# Patient Record
Sex: MALE | Race: WHITE | URBAN - METROPOLITAN AREA
[De-identification: names, ages, dates, MRNs, and addresses within clinical notes are randomized per-mention and may not be internally consistent; named-entity substitution may affect disease eponyms.]

---

## 2019-02-21 ENCOUNTER — OFFICE VISIT (OUTPATIENT)
Dept: URBAN - METROPOLITAN AREA CLINIC 45 | Facility: CLINIC | Age: 69
End: 2019-02-21
Payer: COMMERCIAL

## 2019-02-21 DIAGNOSIS — H52.4 PRESBYOPIA: Primary | ICD-10-CM

## 2019-02-21 DIAGNOSIS — H40.023 OPEN ANGLE WITH BORDERLINE FINDINGS, HIGH RISK, BILATERAL: ICD-10-CM

## 2019-02-21 PROCEDURE — 92015 DETERMINE REFRACTIVE STATE: CPT | Performed by: OPTOMETRIST

## 2019-02-21 PROCEDURE — 92004 COMPRE OPH EXAM NEW PT 1/>: CPT | Performed by: OPTOMETRIST

## 2019-02-21 ASSESSMENT — INTRAOCULAR PRESSURE
OD: 15
OS: 13

## 2019-02-21 ASSESSMENT — VISUAL ACUITY
OS: 20/40
OD: 20/50

## 2019-02-21 ASSESSMENT — KERATOMETRY
OD: 45.25
OS: 42.38

## 2019-04-01 ENCOUNTER — OFFICE VISIT (OUTPATIENT)
Dept: URBAN - METROPOLITAN AREA CLINIC 45 | Facility: CLINIC | Age: 69
End: 2019-04-01
Payer: MEDICARE

## 2019-04-01 PROCEDURE — 92133 CPTRZD OPH DX IMG PST SGM ON: CPT | Performed by: OPTOMETRIST

## 2019-04-01 PROCEDURE — 92083 EXTENDED VISUAL FIELD XM: CPT | Performed by: OPTOMETRIST

## 2019-04-01 PROCEDURE — 99213 OFFICE O/P EST LOW 20 MIN: CPT | Performed by: OPTOMETRIST

## 2019-04-01 RX ORDER — LATANOPROST 50 UG/ML
0.005 % SOLUTION OPHTHALMIC
Qty: 1 | Refills: 6 | Status: INACTIVE
Start: 2019-04-01 | End: 2019-04-24

## 2019-04-01 ASSESSMENT — INTRAOCULAR PRESSURE
OS: 13
OD: 14

## 2019-04-01 NOTE — IMPRESSION/PLAN
Impression: Primary open-angle glaucoma, bilateral, moderate stage: S17.0511. Plan: Vf and oct done today, shows some defects and thinning. Pt to start Latanoprost 1 gtt qhs ou. Educated patient on risks of non-compliance, side effects, benefits and anticipated results, pt understands.

## 2019-04-24 ENCOUNTER — OFFICE VISIT (OUTPATIENT)
Dept: URBAN - METROPOLITAN AREA CLINIC 45 | Facility: CLINIC | Age: 69
End: 2019-04-24
Payer: MEDICARE

## 2019-04-24 PROCEDURE — 99213 OFFICE O/P EST LOW 20 MIN: CPT | Performed by: OPTOMETRIST

## 2019-04-24 RX ORDER — BRIMONIDINE TARTRATE 2 MG/ML
0.2 % SOLUTION/ DROPS OPHTHALMIC
Qty: 1 | Refills: 6 | Status: INACTIVE
Start: 2019-04-24 | End: 2019-05-15

## 2019-04-24 ASSESSMENT — INTRAOCULAR PRESSURE
OS: 13
OD: 13

## 2019-04-24 NOTE — IMPRESSION/PLAN
Impression: Primary open-angle glaucoma, bilateral, moderate stage: Y04.3176. Plan: IOP was not any better with Latanoprost, pt to d.c. Pt to start Brimonidine 1 gtt bid ou. Educated patient on risks of non-compliance, side effects, benefits and anticipated results, pt understands.

## 2019-05-15 ENCOUNTER — OFFICE VISIT (OUTPATIENT)
Dept: URBAN - METROPOLITAN AREA CLINIC 45 | Facility: CLINIC | Age: 69
End: 2019-05-15
Payer: MEDICARE

## 2019-05-15 DIAGNOSIS — H40.1132 PRIMARY OPEN-ANGLE GLAUCOMA, BILATERAL, MODERATE STAGE: Primary | ICD-10-CM

## 2019-05-15 PROCEDURE — 99212 OFFICE O/P EST SF 10 MIN: CPT | Performed by: OPTOMETRIST

## 2019-05-15 RX ORDER — TRAVOPROST 0.04 MG/ML
0.004 % SOLUTION/ DROPS OPHTHALMIC
Qty: 1 | Refills: 6 | Status: INACTIVE
Start: 2019-05-15 | End: 2019-06-13

## 2019-05-15 ASSESSMENT — INTRAOCULAR PRESSURE
OS: 13
OD: 13

## 2019-05-15 NOTE — IMPRESSION/PLAN
Impression: Primary open-angle glaucoma, bilateral, moderate stage: N89.0869.  Plan: no improvement in iop on Brimoninidine, switch to Travatan Z q hs ou

## 2019-06-12 ENCOUNTER — OFFICE VISIT (OUTPATIENT)
Dept: URBAN - METROPOLITAN AREA CLINIC 45 | Facility: CLINIC | Age: 69
End: 2019-06-12
Payer: MEDICARE

## 2019-06-12 PROCEDURE — 99213 OFFICE O/P EST LOW 20 MIN: CPT | Performed by: OPTOMETRIST

## 2019-06-12 ASSESSMENT — INTRAOCULAR PRESSURE
OD: 11
OS: 11

## 2019-06-12 NOTE — IMPRESSION/PLAN
Impression: Primary open-angle glaucoma, bilateral, moderate stage: A99.7102.  Plan: IOP is good, pt to continue with Travatan Z 1 gtt qhs ou

## 2019-10-15 ENCOUNTER — OFFICE VISIT (OUTPATIENT)
Dept: URBAN - METROPOLITAN AREA CLINIC 45 | Facility: CLINIC | Age: 69
End: 2019-10-15
Payer: MEDICARE

## 2019-10-15 DIAGNOSIS — H01.8 OTHER SPECIFIED INFLAMMATION OF EYELID: ICD-10-CM

## 2019-10-15 DIAGNOSIS — H04.123 DRY EYE SYNDROME OF BILATERAL LACRIMAL GLANDS: ICD-10-CM

## 2019-10-15 PROCEDURE — 99213 OFFICE O/P EST LOW 20 MIN: CPT | Performed by: OPTOMETRIST

## 2019-10-15 RX ORDER — TRAVOPROST 0.04 MG/ML
0.004 % SOLUTION/ DROPS OPHTHALMIC
Qty: 7.5 | Refills: 3 | Status: INACTIVE
Start: 2019-10-15 | End: 2019-10-16

## 2019-10-15 ASSESSMENT — INTRAOCULAR PRESSURE
OS: 12
OD: 11

## 2019-10-15 NOTE — IMPRESSION/PLAN
Impression: Primary open-angle glaucoma, bilateral, moderate stage: V85.7551.  Plan: Continue with Travatan Z 1 1gtt qhs ou

## 2020-02-11 ENCOUNTER — OFFICE VISIT (OUTPATIENT)
Dept: URBAN - METROPOLITAN AREA CLINIC 45 | Facility: CLINIC | Age: 70
End: 2020-02-11
Payer: MEDICARE

## 2020-02-11 PROCEDURE — 92014 COMPRE OPH EXAM EST PT 1/>: CPT | Performed by: OPTOMETRIST

## 2020-02-11 RX ORDER — TRAVOPROST 0.04 MG/ML
0.004 % SOLUTION/ DROPS OPHTHALMIC
Qty: 7.5 | Refills: 3 | Status: INACTIVE
Start: 2020-02-11 | End: 2020-06-16

## 2020-02-11 ASSESSMENT — INTRAOCULAR PRESSURE
OS: 13
OD: 12

## 2020-02-11 NOTE — IMPRESSION/PLAN
Impression: Primary open-angle glaucoma, bilateral, moderate stage: J07.7645. Plan: IOP stable, well controlled. Continue Travatan Z QHS OU.

## 2020-06-16 ENCOUNTER — TESTING ONLY (OUTPATIENT)
Dept: URBAN - METROPOLITAN AREA CLINIC 45 | Facility: CLINIC | Age: 70
End: 2020-06-16
Payer: MEDICARE

## 2020-06-16 PROCEDURE — 99213 OFFICE O/P EST LOW 20 MIN: CPT | Performed by: OPTOMETRIST

## 2020-06-16 PROCEDURE — 92083 EXTENDED VISUAL FIELD XM: CPT | Performed by: OPTOMETRIST

## 2020-06-16 PROCEDURE — 92133 CPTRZD OPH DX IMG PST SGM ON: CPT | Performed by: OPTOMETRIST

## 2020-06-16 RX ORDER — TRAVOPROST OPHTHALMIC SOLUTION, 0.004% 0.04 MG/ML
0.004 % SOLUTION/ DROPS OPHTHALMIC
Qty: 7.5 | Refills: 11 | Status: INACTIVE
Start: 2020-06-16 | End: 2020-09-15

## 2020-06-16 ASSESSMENT — INTRAOCULAR PRESSURE
OD: 12
OS: 11

## 2020-06-16 NOTE — IMPRESSION/PLAN
Impression: Primary open-angle glaucoma, bilateral, moderate stage: J55.3142. Plan: vf, oct and iop stable ou. Insurance request to switch to generic. Switch to Travapost 1 gtt QHS OU.

## 2020-09-15 ENCOUNTER — OFFICE VISIT (OUTPATIENT)
Dept: URBAN - METROPOLITAN AREA CLINIC 45 | Facility: CLINIC | Age: 70
End: 2020-09-15
Payer: MEDICARE

## 2020-09-15 DIAGNOSIS — H25.13 AGE-RELATED NUCLEAR CATARACT, BILATERAL: ICD-10-CM

## 2020-09-15 PROCEDURE — 99213 OFFICE O/P EST LOW 20 MIN: CPT | Performed by: OPTOMETRIST

## 2020-09-15 RX ORDER — TRAVOPROST OPHTHALMIC SOLUTION, 0.004% 0.04 MG/ML
0.004 % SOLUTION/ DROPS OPHTHALMIC
Qty: 7.5 | Refills: 11 | Status: INACTIVE
Start: 2020-09-15 | End: 2021-01-15

## 2020-09-15 ASSESSMENT — INTRAOCULAR PRESSURE
OD: 12
OS: 10

## 2020-09-15 NOTE — IMPRESSION/PLAN
Impression: Primary open-angle glaucoma, bilateral, moderate stage: N74.4211. Plan: IOP stable, well controlled OU, Continue with Travaprost QHS OU.

## 2020-09-15 NOTE — IMPRESSION/PLAN
Impression: Age-related nuclear cataract, bilateral: H25.13. Plan: No treatment is required at this time. Will continue to observe condition and or symptoms.

## 2021-01-15 ENCOUNTER — OFFICE VISIT (OUTPATIENT)
Dept: URBAN - METROPOLITAN AREA CLINIC 45 | Facility: CLINIC | Age: 71
End: 2021-01-15
Payer: MEDICARE

## 2021-01-15 PROCEDURE — 99214 OFFICE O/P EST MOD 30 MIN: CPT | Performed by: OPTOMETRIST

## 2021-01-15 RX ORDER — BIMATOPROST 0.1 MG/ML
0.01 % SOLUTION/ DROPS OPHTHALMIC
Qty: 2.5 | Refills: 6 | Status: INACTIVE
Start: 2021-01-15 | End: 2021-03-17

## 2021-01-15 ASSESSMENT — INTRAOCULAR PRESSURE
OD: 14
OS: 15

## 2021-01-15 NOTE — IMPRESSION/PLAN
Impression: Primary open-angle glaucoma, bilateral, moderate stage: O42.5206.  Plan: IOP IS ELEVATED TODAY OU, DC TRAVOPROST AND SWITCH TO LUMIGAN Q HS OU

## 2021-02-10 ENCOUNTER — OFFICE VISIT (OUTPATIENT)
Dept: URBAN - METROPOLITAN AREA CLINIC 45 | Facility: CLINIC | Age: 71
End: 2021-02-10
Payer: MEDICARE

## 2021-02-10 PROCEDURE — 99213 OFFICE O/P EST LOW 20 MIN: CPT | Performed by: OPTOMETRIST

## 2021-02-10 ASSESSMENT — INTRAOCULAR PRESSURE
OD: 14
OS: 15

## 2021-02-10 NOTE — IMPRESSION/PLAN
Impression: Primary open-angle glaucoma, bilateral, moderate stage: X11.6140.  Plan: iop is not much better on Lumigan but pt has not been on it for a full 3 weeks, recheck in 1 mos, dfe next visit

## 2021-03-17 ENCOUNTER — OFFICE VISIT (OUTPATIENT)
Dept: URBAN - METROPOLITAN AREA CLINIC 45 | Facility: CLINIC | Age: 71
End: 2021-03-17
Payer: MEDICARE

## 2021-03-17 PROCEDURE — 99213 OFFICE O/P EST LOW 20 MIN: CPT | Performed by: OPTOMETRIST

## 2021-03-17 RX ORDER — BIMATOPROST 0.1 MG/ML
0.01 % SOLUTION/ DROPS OPHTHALMIC
Qty: 1 | Refills: 6 | Status: INACTIVE
Start: 2021-03-17 | End: 2021-06-10

## 2021-03-17 ASSESSMENT — INTRAOCULAR PRESSURE
OD: 12
OS: 11

## 2021-03-17 NOTE — IMPRESSION/PLAN
Impression: Primary open-angle glaucoma, bilateral, moderate stage: E24.8811.  Plan: iop is better, continue lumigan q hs ou

## 2021-06-10 ENCOUNTER — OFFICE VISIT (OUTPATIENT)
Dept: URBAN - METROPOLITAN AREA CLINIC 45 | Facility: CLINIC | Age: 71
End: 2021-06-10
Payer: MEDICARE

## 2021-06-10 PROCEDURE — 76514 ECHO EXAM OF EYE THICKNESS: CPT | Performed by: OPTOMETRIST

## 2021-06-10 PROCEDURE — 92083 EXTENDED VISUAL FIELD XM: CPT | Performed by: OPTOMETRIST

## 2021-06-10 PROCEDURE — 99213 OFFICE O/P EST LOW 20 MIN: CPT | Performed by: OPTOMETRIST

## 2021-06-10 PROCEDURE — 92133 CPTRZD OPH DX IMG PST SGM ON: CPT | Performed by: OPTOMETRIST

## 2021-06-10 RX ORDER — BIMATOPROST 0.1 MG/ML
0.01 % SOLUTION/ DROPS OPHTHALMIC
Qty: 1 | Refills: 6 | Status: INACTIVE
Start: 2021-06-10 | End: 2021-09-07

## 2021-06-10 ASSESSMENT — INTRAOCULAR PRESSURE
OD: 12
OS: 14

## 2021-06-10 NOTE — IMPRESSION/PLAN
Impression: Primary open-angle glaucoma, bilateral, moderate stage: O49.8730. Plan:  VF WNL OU, OCT Thinning OU, Stable. CCT Thin OU. IOP is controlled OU, Continue with Lumigan QHS OU.

## 2021-10-11 ENCOUNTER — OFFICE VISIT (OUTPATIENT)
Dept: URBAN - METROPOLITAN AREA CLINIC 45 | Facility: CLINIC | Age: 71
End: 2021-10-11
Payer: MEDICARE

## 2021-10-11 PROCEDURE — 99213 OFFICE O/P EST LOW 20 MIN: CPT | Performed by: OPTOMETRIST

## 2021-10-11 RX ORDER — BIMATOPROST 0.1 MG/ML
0.01 % SOLUTION/ DROPS OPHTHALMIC
Qty: 2.5 | Refills: 6 | Status: INACTIVE
Start: 2021-10-11 | End: 2022-03-01

## 2021-10-11 ASSESSMENT — INTRAOCULAR PRESSURE
OD: 13
OS: 14

## 2021-10-11 NOTE — IMPRESSION/PLAN
Impression: Primary open-angle glaucoma, bilateral, moderate stage: A00.2302.  Plan: iop is stable ou, continue lumigan q hs ou

## 2022-03-01 ENCOUNTER — OFFICE VISIT (OUTPATIENT)
Dept: URBAN - METROPOLITAN AREA CLINIC 45 | Facility: CLINIC | Age: 72
End: 2022-03-01
Payer: MEDICARE

## 2022-03-01 DIAGNOSIS — H25.813 COMBINED FORMS OF AGE-RELATED CATARACT, BILATERAL: ICD-10-CM

## 2022-03-01 DIAGNOSIS — E11.9 TYPE 2 DIABETES MELLITUS WITHOUT COMPLICATIONS: ICD-10-CM

## 2022-03-01 DIAGNOSIS — H43.813 VITREOUS DEGENERATION, BILATERAL: ICD-10-CM

## 2022-03-01 PROCEDURE — 92014 COMPRE OPH EXAM EST PT 1/>: CPT | Performed by: OPTOMETRIST

## 2022-03-01 RX ORDER — BIMATOPROST 0.1 MG/ML
0.01 % SOLUTION/ DROPS OPHTHALMIC
Qty: 2.5 | Refills: 6 | Status: ACTIVE
Start: 2022-03-01

## 2022-03-01 ASSESSMENT — INTRAOCULAR PRESSURE
OS: 12
OD: 12

## 2022-03-01 NOTE — IMPRESSION/PLAN
Impression: Combined forms of age-related cataract, bilateral: H25.813. Plan: Discussed diagnosis in detail with patient. Surgery not indicated, will monitor. Call if [address]

## 2022-03-01 NOTE — IMPRESSION/PLAN
Impression: Vitreous degeneration, bilateral: H43.813. Plan: Photos taken today ou- pvd ou. No treatment is required at this time. Will continue to observe condition and or symptoms.

## 2022-03-01 NOTE — IMPRESSION/PLAN
Impression: Type 2 diabetes mellitus without complications: R35.4. Plan: Diabetes type II: no background retinopathy, no signs of neovascularization noted. Discussed ocular and systemic benefits of blood sugar control.

## 2022-03-09 NOTE — IMPRESSION/PLAN
Impression: Presbyopia: H52.4.  Plan: Return for new MRX - Patient has history of hypothyroidism   - Resumed home meds

## 2022-07-01 ENCOUNTER — OFFICE VISIT (OUTPATIENT)
Dept: URBAN - METROPOLITAN AREA CLINIC 45 | Facility: CLINIC | Age: 72
End: 2022-07-01
Payer: MEDICARE

## 2022-07-01 DIAGNOSIS — H40.1132 PRIMARY OPEN-ANGLE GLAUCOMA, BILATERAL, MODERATE STAGE: Primary | ICD-10-CM

## 2022-07-01 DIAGNOSIS — H04.123 DRY EYE SYNDROME OF BILATERAL LACRIMAL GLANDS: ICD-10-CM

## 2022-07-01 DIAGNOSIS — H25.13 AGE-RELATED NUCLEAR CATARACT, BILATERAL: ICD-10-CM

## 2022-07-01 PROCEDURE — 99213 OFFICE O/P EST LOW 20 MIN: CPT | Performed by: OPTOMETRIST

## 2022-07-01 PROCEDURE — 92083 EXTENDED VISUAL FIELD XM: CPT | Performed by: OPTOMETRIST

## 2022-07-01 PROCEDURE — 92133 CPTRZD OPH DX IMG PST SGM ON: CPT | Performed by: OPTOMETRIST

## 2022-07-01 RX ORDER — BIMATOPROST 0.1 MG/ML
0.01 % SOLUTION/ DROPS OPHTHALMIC
Qty: 2.5 | Refills: 6 | Status: ACTIVE
Start: 2022-07-01

## 2022-07-01 ASSESSMENT — INTRAOCULAR PRESSURE
OD: 12
OS: 12

## 2022-07-01 NOTE — IMPRESSION/PLAN
Impression: Primary open-angle glaucoma, bilateral, moderate stage: Z22.0343.  Plan: vf and oct ordered and done today ou

oct - mod rnfl loss ou- stable ou

vf- mod restriction- stable ou

iop is good ou, continue [facility]er ou

## 2022-11-04 ENCOUNTER — OFFICE VISIT (OUTPATIENT)
Dept: URBAN - METROPOLITAN AREA CLINIC 45 | Facility: CLINIC | Age: 72
End: 2022-11-04
Payer: MEDICARE

## 2022-11-04 DIAGNOSIS — H40.1132 PRIMARY OPEN-ANGLE GLAUCOMA, BILATERAL, MODERATE STAGE: ICD-10-CM

## 2022-11-04 DIAGNOSIS — H04.123 DRY EYE SYNDROME OF BILATERAL LACRIMAL GLANDS: Primary | ICD-10-CM

## 2022-11-04 DIAGNOSIS — H25.13 AGE-RELATED NUCLEAR CATARACT, BILATERAL: ICD-10-CM

## 2022-11-04 PROCEDURE — 99213 OFFICE O/P EST LOW 20 MIN: CPT | Performed by: OPTOMETRIST

## 2022-11-04 RX ORDER — BIMATOPROST 0.1 MG/ML
0.01 % SOLUTION/ DROPS OPHTHALMIC
Qty: 7.5 | Refills: 5 | Status: ACTIVE
Start: 2022-11-04

## 2022-11-04 ASSESSMENT — INTRAOCULAR PRESSURE
OS: 12
OD: 12

## 2022-11-04 NOTE — IMPRESSION/PLAN
Impression: Primary open-angle glaucoma, bilateral, moderate stage: F53.0633.  Plan: iop is controlled ou, continue [facility]er ou

## 2023-03-06 ENCOUNTER — OFFICE VISIT (OUTPATIENT)
Dept: URBAN - METROPOLITAN AREA CLINIC 45 | Facility: CLINIC | Age: 73
End: 2023-03-06
Payer: COMMERCIAL

## 2023-03-06 DIAGNOSIS — E11.9 TYPE 2 DIABETES MELLITUS WITHOUT COMPLICATIONS: ICD-10-CM

## 2023-03-06 DIAGNOSIS — H40.1132 PRIMARY OPEN-ANGLE GLAUCOMA, BILATERAL, MODERATE STAGE: ICD-10-CM

## 2023-03-06 DIAGNOSIS — H43.813 VITREOUS DEGENERATION, BILATERAL: ICD-10-CM

## 2023-03-06 DIAGNOSIS — H52.4 PRESBYOPIA: Primary | ICD-10-CM

## 2023-03-06 DIAGNOSIS — H25.813 COMBINED FORMS OF AGE-RELATED CATARACT, BILATERAL: ICD-10-CM

## 2023-03-06 PROCEDURE — 92014 COMPRE OPH EXAM EST PT 1/>: CPT | Performed by: OPTOMETRIST

## 2023-03-06 RX ORDER — BIMATOPROST 0.1 MG/ML
0.01 % SOLUTION/ DROPS OPHTHALMIC
Qty: 7.5 | Refills: 5 | Status: ACTIVE
Start: 2023-03-06

## 2023-03-06 ASSESSMENT — VISUAL ACUITY
OD: 20/40
OS: 20/30

## 2023-03-06 ASSESSMENT — INTRAOCULAR PRESSURE
OS: 15
OD: 15

## 2023-03-06 NOTE — IMPRESSION/PLAN
Impression: Primary open-angle glaucoma, bilateral, moderate stage: J21.3132. Plan: IOP is good ou, continue Lumigan QHS OU.

## 2023-03-06 NOTE — IMPRESSION/PLAN
Impression: Combined forms of age-related cataract, bilateral: H25.813. Plan: Cataracts account for pt complaints. Do not recommend SX at this time. Will monitor for changes. Call if [address] worsens.

## 2023-03-06 NOTE — IMPRESSION/PLAN
Impression: Type 2 diabetes mellitus without complications: Y71.8. Plan: Diabetes type II: no background retinopathy, no signs of neovascularization noted. Discussed ocular and systemic benefits of blood sugar control.

## 2023-07-10 ENCOUNTER — OFFICE VISIT (OUTPATIENT)
Dept: URBAN - METROPOLITAN AREA CLINIC 45 | Facility: CLINIC | Age: 73
End: 2023-07-10
Payer: MEDICARE

## 2023-07-10 DIAGNOSIS — H04.123 DRY EYE SYNDROME OF BILATERAL LACRIMAL GLANDS: ICD-10-CM

## 2023-07-10 DIAGNOSIS — H40.1132 PRIMARY OPEN-ANGLE GLAUCOMA, BILATERAL, MODERATE STAGE: Primary | ICD-10-CM

## 2023-07-10 PROCEDURE — 99213 OFFICE O/P EST LOW 20 MIN: CPT | Performed by: OPTOMETRIST

## 2023-07-10 PROCEDURE — 92133 CPTRZD OPH DX IMG PST SGM ON: CPT | Performed by: OPTOMETRIST

## 2023-07-10 PROCEDURE — 92083 EXTENDED VISUAL FIELD XM: CPT | Performed by: OPTOMETRIST

## 2023-07-10 RX ORDER — BIMATOPROST 0.1 MG/ML
0.01 % SOLUTION/ DROPS OPHTHALMIC
Qty: 7.5 | Refills: 5 | Status: ACTIVE
Start: 2023-07-10

## 2023-07-10 ASSESSMENT — INTRAOCULAR PRESSURE
OS: 12
OD: 12

## 2023-07-10 NOTE — IMPRESSION/PLAN
Impression: Primary open-angle glaucoma, bilateral, moderate stage: K80.4491. Plan: IOP is good ou, vf, oct is stable ou.

continue [facility] ou.

## 2023-11-28 ENCOUNTER — OFFICE VISIT (OUTPATIENT)
Dept: URBAN - METROPOLITAN AREA CLINIC 45 | Facility: CLINIC | Age: 73
End: 2023-11-28
Payer: MEDICARE

## 2023-11-28 DIAGNOSIS — H40.1132 PRIMARY OPEN-ANGLE GLAUCOMA, BILATERAL, MODERATE STAGE: Primary | ICD-10-CM

## 2023-11-28 DIAGNOSIS — H25.13 AGE-RELATED NUCLEAR CATARACT, BILATERAL: ICD-10-CM

## 2023-11-28 PROCEDURE — 99213 OFFICE O/P EST LOW 20 MIN: CPT | Performed by: OPTOMETRIST

## 2023-11-28 RX ORDER — BIMATOPROST 0.1 MG/ML
0.01 % SOLUTION/ DROPS OPHTHALMIC
Qty: 7.5 | Refills: 5 | Status: ACTIVE
Start: 2023-11-28

## 2023-11-28 ASSESSMENT — INTRAOCULAR PRESSURE
OS: 15
OD: 16

## 2024-03-26 ENCOUNTER — OFFICE VISIT (OUTPATIENT)
Dept: URBAN - METROPOLITAN AREA CLINIC 45 | Facility: CLINIC | Age: 74
End: 2024-03-26
Payer: COMMERCIAL

## 2024-03-26 DIAGNOSIS — H43.813 VITREOUS DEGENERATION, BILATERAL: ICD-10-CM

## 2024-03-26 DIAGNOSIS — H25.813 COMBINED FORMS OF AGE-RELATED CATARACT, BILATERAL: ICD-10-CM

## 2024-03-26 DIAGNOSIS — H40.1132 PRIMARY OPEN-ANGLE GLAUCOMA, BILATERAL, MODERATE STAGE: Primary | ICD-10-CM

## 2024-03-26 DIAGNOSIS — H04.123 DRY EYE SYNDROME OF BILATERAL LACRIMAL GLANDS: ICD-10-CM

## 2024-03-26 DIAGNOSIS — E11.9 TYPE 2 DIABETES MELLITUS WITHOUT COMPLICATIONS: ICD-10-CM

## 2024-03-26 PROCEDURE — 92014 COMPRE OPH EXAM EST PT 1/>: CPT | Performed by: OPTOMETRIST

## 2024-03-26 RX ORDER — BIMATOPROST 0.1 MG/ML
0.01 % SOLUTION/ DROPS OPHTHALMIC
Qty: 7.5 | Refills: 5 | Status: ACTIVE
Start: 2024-03-26

## 2024-03-26 ASSESSMENT — INTRAOCULAR PRESSURE
OD: 14
OS: 12

## 2024-07-29 ENCOUNTER — OFFICE VISIT (OUTPATIENT)
Dept: URBAN - METROPOLITAN AREA CLINIC 45 | Facility: CLINIC | Age: 74
End: 2024-07-29
Payer: COMMERCIAL

## 2024-07-29 DIAGNOSIS — H25.13 AGE-RELATED NUCLEAR CATARACT, BILATERAL: ICD-10-CM

## 2024-07-29 DIAGNOSIS — H40.1132 PRIMARY OPEN-ANGLE GLAUCOMA, BILATERAL, MODERATE STAGE: Primary | ICD-10-CM

## 2024-07-29 PROCEDURE — 92133 CPTRZD OPH DX IMG PST SGM ON: CPT | Performed by: OPTOMETRIST

## 2024-07-29 PROCEDURE — 99213 OFFICE O/P EST LOW 20 MIN: CPT | Performed by: OPTOMETRIST

## 2024-07-29 PROCEDURE — 92083 EXTENDED VISUAL FIELD XM: CPT | Performed by: OPTOMETRIST

## 2024-07-29 RX ORDER — BIMATOPROST 0.1 MG/ML
0.01 % SOLUTION/ DROPS OPHTHALMIC
Qty: 7.5 | Refills: 5 | Status: ACTIVE
Start: 2024-07-29

## 2024-07-29 ASSESSMENT — INTRAOCULAR PRESSURE
OS: 15
OD: 15

## 2024-11-26 ENCOUNTER — OFFICE VISIT (OUTPATIENT)
Dept: URBAN - METROPOLITAN AREA CLINIC 45 | Facility: CLINIC | Age: 74
End: 2024-11-26
Payer: COMMERCIAL

## 2024-11-26 DIAGNOSIS — H25.13 AGE-RELATED NUCLEAR CATARACT, BILATERAL: ICD-10-CM

## 2024-11-26 DIAGNOSIS — H40.1132 PRIMARY OPEN-ANGLE GLAUCOMA, BILATERAL, MODERATE STAGE: Primary | ICD-10-CM

## 2024-11-26 DIAGNOSIS — H04.123 DRY EYE SYNDROME OF BILATERAL LACRIMAL GLANDS: ICD-10-CM

## 2024-11-26 PROCEDURE — 99213 OFFICE O/P EST LOW 20 MIN: CPT | Performed by: OPTOMETRIST

## 2024-11-26 RX ORDER — BIMATOPROST 0.1 MG/ML
0.01 % SOLUTION/ DROPS OPHTHALMIC
Qty: 7.5 | Refills: 6 | Status: ACTIVE
Start: 2024-11-26

## 2024-11-26 RX ORDER — BIMATOPROST 0.1 MG/ML
0.01 % SOLUTION/ DROPS OPHTHALMIC
Qty: 7.5 | Refills: 5 | Status: INACTIVE
Start: 2024-11-26 | End: 2024-11-26

## 2024-11-26 ASSESSMENT — INTRAOCULAR PRESSURE
OD: 11
OS: 11

## 2025-03-21 ENCOUNTER — OFFICE VISIT (OUTPATIENT)
Dept: URBAN - METROPOLITAN AREA CLINIC 45 | Facility: CLINIC | Age: 75
End: 2025-03-21
Payer: COMMERCIAL

## 2025-03-21 DIAGNOSIS — H25.813 COMBINED FORMS OF AGE-RELATED CATARACT, BILATERAL: ICD-10-CM

## 2025-03-21 DIAGNOSIS — E11.9 TYPE 2 DIABETES MELLITUS WITHOUT COMPLICATIONS: ICD-10-CM

## 2025-03-21 DIAGNOSIS — H40.1132 PRIMARY OPEN-ANGLE GLAUCOMA, BILATERAL, MODERATE STAGE: Primary | ICD-10-CM

## 2025-03-21 DIAGNOSIS — H43.813 VITREOUS DEGENERATION, BILATERAL: ICD-10-CM

## 2025-03-21 DIAGNOSIS — H04.123 DRY EYE SYNDROME OF BILATERAL LACRIMAL GLANDS: ICD-10-CM

## 2025-03-21 PROCEDURE — 92014 COMPRE OPH EXAM EST PT 1/>: CPT | Performed by: OPTOMETRIST

## 2025-03-21 RX ORDER — BIMATOPROST 0.1 MG/ML
0.01 % SOLUTION/ DROPS OPHTHALMIC
Qty: 7.5 | Refills: 6 | Status: ACTIVE
Start: 2025-03-21

## 2025-03-21 ASSESSMENT — INTRAOCULAR PRESSURE
OS: 15
OD: 15

## 2025-07-03 ENCOUNTER — OFFICE VISIT (OUTPATIENT)
Dept: URBAN - METROPOLITAN AREA CLINIC 45 | Facility: CLINIC | Age: 75
End: 2025-07-03
Payer: COMMERCIAL

## 2025-07-03 DIAGNOSIS — H25.813 COMBINED FORMS OF AGE-RELATED CATARACT, BILATERAL: ICD-10-CM

## 2025-07-03 DIAGNOSIS — H40.1132 PRIMARY OPEN-ANGLE GLAUCOMA, BILATERAL, MODERATE STAGE: Primary | ICD-10-CM

## 2025-07-03 DIAGNOSIS — H04.123 DRY EYE SYNDROME OF BILATERAL LACRIMAL GLANDS: ICD-10-CM

## 2025-07-03 PROCEDURE — 99214 OFFICE O/P EST MOD 30 MIN: CPT | Performed by: OPTOMETRIST

## 2025-07-03 PROCEDURE — 92083 EXTENDED VISUAL FIELD XM: CPT | Performed by: OPTOMETRIST

## 2025-07-03 PROCEDURE — 92133 CPTRZD OPH DX IMG PST SGM ON: CPT | Performed by: OPTOMETRIST

## 2025-07-03 RX ORDER — BRIMONIDINE TARTRATE 2 MG/ML
0.2 % SOLUTION/ DROPS OPHTHALMIC
Qty: 5 | Refills: 6 | Status: ACTIVE
Start: 2025-07-03

## 2025-07-03 RX ORDER — BIMATOPROST 0.1 MG/ML
0.01 % SOLUTION/ DROPS OPHTHALMIC
Qty: 7.5 | Refills: 6 | Status: ACTIVE
Start: 2025-07-03

## 2025-07-03 ASSESSMENT — INTRAOCULAR PRESSURE
OD: 17
OS: 15

## 2025-07-31 ENCOUNTER — OFFICE VISIT (OUTPATIENT)
Dept: URBAN - METROPOLITAN AREA CLINIC 45 | Facility: CLINIC | Age: 75
End: 2025-07-31
Payer: MEDICARE

## 2025-07-31 DIAGNOSIS — H40.1132 PRIMARY OPEN-ANGLE GLAUCOMA, BILATERAL, MODERATE STAGE: Primary | ICD-10-CM

## 2025-07-31 DIAGNOSIS — H04.123 DRY EYE SYNDROME OF BILATERAL LACRIMAL GLANDS: ICD-10-CM

## 2025-07-31 DIAGNOSIS — H17.9 CORNEAL SCAR: ICD-10-CM

## 2025-07-31 DIAGNOSIS — H25.813 COMBINED FORMS OF AGE-RELATED CATARACT, BILATERAL: ICD-10-CM

## 2025-07-31 PROCEDURE — 99213 OFFICE O/P EST LOW 20 MIN: CPT | Performed by: OPTOMETRIST

## 2025-07-31 ASSESSMENT — INTRAOCULAR PRESSURE
OS: 12
OD: 11
OD: 12